# Patient Record
Sex: MALE | Race: AMERICAN INDIAN OR ALASKA NATIVE
[De-identification: names, ages, dates, MRNs, and addresses within clinical notes are randomized per-mention and may not be internally consistent; named-entity substitution may affect disease eponyms.]

---

## 2018-06-04 ENCOUNTER — HOSPITAL ENCOUNTER (EMERGENCY)
Dept: HOSPITAL 5 - ED | Age: 48
LOS: 1 days | Discharge: PSYCHIATRIC OR SUBSTANCE USE FACILITY | End: 2018-06-05
Payer: MEDICARE

## 2018-06-04 DIAGNOSIS — Z79.899: ICD-10-CM

## 2018-06-04 DIAGNOSIS — F14.10: ICD-10-CM

## 2018-06-04 DIAGNOSIS — F12.10: ICD-10-CM

## 2018-06-04 DIAGNOSIS — F32.9: Primary | ICD-10-CM

## 2018-06-04 DIAGNOSIS — F10.129: ICD-10-CM

## 2018-06-04 PROCEDURE — 99285 EMERGENCY DEPT VISIT HI MDM: CPT

## 2018-06-04 PROCEDURE — 81001 URINALYSIS AUTO W/SCOPE: CPT

## 2018-06-04 PROCEDURE — 70450 CT HEAD/BRAIN W/O DYE: CPT

## 2018-06-04 PROCEDURE — 85025 COMPLETE CBC W/AUTO DIFF WBC: CPT

## 2018-06-04 PROCEDURE — 80320 DRUG SCREEN QUANTALCOHOLS: CPT

## 2018-06-04 PROCEDURE — 96360 HYDRATION IV INFUSION INIT: CPT

## 2018-06-04 PROCEDURE — G0480 DRUG TEST DEF 1-7 CLASSES: HCPCS

## 2018-06-04 PROCEDURE — 80307 DRUG TEST PRSMV CHEM ANLYZR: CPT

## 2018-06-04 PROCEDURE — 36415 COLL VENOUS BLD VENIPUNCTURE: CPT

## 2018-06-04 PROCEDURE — 80053 COMPREHEN METABOLIC PANEL: CPT

## 2018-06-05 VITALS — DIASTOLIC BLOOD PRESSURE: 96 MMHG | SYSTOLIC BLOOD PRESSURE: 149 MMHG

## 2018-06-05 LAB
ALBUMIN SERPL-MCNC: 3.6 G/DL (ref 3.9–5)
ALT SERPL-CCNC: 13 UNITS/L (ref 7–56)
BASOPHILS # (AUTO): 0 K/MM3 (ref 0–0.1)
BASOPHILS NFR BLD AUTO: 0.3 % (ref 0–1.8)
BENZODIAZEPINES SCREEN,URINE: (no result)
BILIRUB UR QL STRIP: (no result)
BLOOD UR QL VISUAL: (no result)
BUN SERPL-MCNC: 9 MG/DL (ref 9–20)
BUN/CREAT SERPL: 11 %
CALCIUM SERPL-MCNC: 8.5 MG/DL (ref 8.4–10.2)
EOSINOPHIL # BLD AUTO: 0 K/MM3 (ref 0–0.4)
EOSINOPHIL NFR BLD AUTO: 0.5 % (ref 0–4.3)
HCT VFR BLD CALC: 38.6 % (ref 35.5–45.6)
HEMOLYSIS INDEX: 8
HGB BLD-MCNC: 13 GM/DL (ref 11.8–15.2)
LYMPHOCYTES # BLD AUTO: 1 K/MM3 (ref 1.2–5.4)
LYMPHOCYTES NFR BLD AUTO: 32.6 % (ref 13.4–35)
MCH RBC QN AUTO: 31 PG (ref 28–32)
MCHC RBC AUTO-ENTMCNC: 34 % (ref 32–34)
MCV RBC AUTO: 91 FL (ref 84–94)
METHADONE SCREEN,URINE: (no result)
MONOCYTES # (AUTO): 0.4 K/MM3 (ref 0–0.8)
MONOCYTES % (AUTO): 11.4 % (ref 0–7.3)
MUCOUS THREADS #/AREA URNS HPF: (no result) /HPF
OPIATE SCREEN,URINE: (no result)
PH UR STRIP: 6 [PH] (ref 5–7)
PLATELET # BLD: 197 K/MM3 (ref 140–440)
PROT UR STRIP-MCNC: (no result) MG/DL
RBC # BLD AUTO: 4.23 M/MM3 (ref 3.65–5.03)
RBC #/AREA URNS HPF: 1 /HPF (ref 0–6)
UROBILINOGEN UR-MCNC: 4 MG/DL (ref ?–2)
WBC #/AREA URNS HPF: 1 /HPF (ref 0–6)

## 2018-06-05 NOTE — CAT SCAN REPORT
FINAL REPORT



EXAM:  CT HEAD/BRAIN WO CON



HISTORY:  lethargic, obvious alcohol, uncertain if trauma 



TECHNIQUE:  Routine axial imaging was obtained of the brain

without IV contrast. There are no previous studies available for

comparison.



FINDINGS:  

There is mild generalized atrophy. There are remote strokes in

the right frontal lobe and right temporal lobe. There are remote

lacunar infarcts in the basal ganglia bilaterally as well as in

the right cerebellar hemisphere. There is no evidence of acute

stroke or hemorrhage. The ventricular system is symmetric in

size. The sinuses reveal secretions in the sphenoid sinuses. The

mastoid air cells are well pneumatized. Calvarium is

unremarkable.



IMPRESSION:  

Mild generalized atrophy with remote strokes in the right frontal

and right temporal lobes.



Bilateral remote lacunar infarcts in the basal ganglia.



No definite acute stroke or hemorrhage identified.



Sphenoid sinusitis.

## 2018-06-05 NOTE — CONSULTATION
History of Present Illness





- Reason for Consult


Consult date: 06/05/18


Reason for consult: Mental Health Evaluation


Requesting physician: PABLO LÓPEZ





- Chief Complaint


Chief complaint: 


"I wanted to die"








- History of Present Psychiatric Illness


47 y.o. AA male presenting to the ER for suicide attempt by taking pills. Today 

the patient is calm and cooperative during the assessment. He stated that his 

life is a "mess" because he is unemployed and having family issues. He stated 

that he attempted to take 20 plus pills he couldn't ID prior to his admission 

to the ER to kill himself. Also, he stated that he was drinking (etoh) 

yesterday to lower his depression. He rate his depression 9/10, with 10 being 

the worse. He stated that he feel sad and hopeless with no reason to live. He 

stated having a long hx of alcohol abuse and recreational drug use. He would 

not confirm or deny SI's, but denies HI's and AVH's. He denies any manic 

episodes, but acknowledged erratic sleep.  








Medications and Allergies


 Allergies











Allergy/AdvReac Type Severity Reaction Status Date / Time


 


No Known Allergies Allergy   Unverified 06/04/18 23:42














Past psychiatric history





- Past Medical History


Past Medical History: HIV/AIDS


Past Surgical History: No surgical history





- past Psychiatric treatment and history


psychiatric treatment history: 


Would not confirm or deny a psy hx. He acknowledged a fam psy hx of mood do's. 








- Social History


Social history: lives with family





Mental Status Exam





- Vital signs


 Last Vital Signs











Temp  98.2 F   06/05/18 10:05


 


Pulse  82   06/05/18 10:05


 


Resp  18   06/05/18 10:05


 


BP  118/74   06/05/18 10:05


 


Pulse Ox  100   06/05/18 10:05














- Exam


Narrative exam: 


MSE:





 Appearance: calm    


 Behavior: regular eye contact


 Speech: regular rate and low tone


 Mood: sad, withdrawn, depressed


 Affect: congruent to mood 


 Thought Process: circumstantial               


 Thought Content: denies HI's and AVH's, he would not confirm or deny SI's


 Motor Activity: ambulatory


 Cognition: A/O x 3


 Insight: variable 


 Judgment: variable   














Results


Result Diagrams: 


 06/05/18 04:38





 06/05/18 04:38


 Abnormal lab results











  06/05/18 06/05/18 06/05/18 Range/Units





  04:38 04:38 04:38 


 


WBC  3.2 L    (4.5-11.0)  K/mm3


 


Mono % (Auto)  11.4 H    (0.0-7.3)  %


 


Lymph #  1.0 L    (1.2-5.4)  K/mm3


 


Glucose   107 H   ()  mg/dL


 


Albumin   3.6 L   (3.9-5)  g/dL


 


Salicylates    < 0.3 L  (2.8-20.0)  mg/dL


 


Acetaminophen     (10.0-30.0)  ug/mL














  06/05/18 Range/Units





  04:38 


 


WBC   (4.5-11.0)  K/mm3


 


Mono % (Auto)   (0.0-7.3)  %


 


Lymph #   (1.2-5.4)  K/mm3


 


Glucose   ()  mg/dL


 


Albumin   (3.9-5)  g/dL


 


Salicylates   (2.8-20.0)  mg/dL


 


Acetaminophen  < 5.0 L  (10.0-30.0)  ug/mL








All other labs normal.








Assessment and Plan


Assessment and plan: 


Impression: MDD, Severe Type. Substance Use DO (cocaine). Cannabis Use DO. The 

patient attempted suicide by taking 20 plus pills he couldn't ID yesterday. 

Today the patient is calm and cooperative during the assessment. The patient 

would not confirm or deny SI's.





DDx: R/O Bipolar DO, R/O Substance Induced Mood DO





Recommendation/Plan: Continue 1013 with placement to inpatient psy services. 

Start Remeron 15 mg PO HS for depression. Discusses possible suicidality/

medication induced howie with patient reference Remeron.

## 2018-06-05 NOTE — EMERGENCY DEPARTMENT REPORT
ED General Adult HPI





- General


Chief complaint: Alcohol


Stated complaint: ETOH


Time Seen by Provider: 06/05/18 04:18


Source: patient, EMS


Mode of arrival: Stretcher


Limitations: No Limitations, Altered Mental Status





- History of Present Illness


Initial comments: 





Patient brought in with report that he was about to take some pills, with some 

intent of apparent harm to himself, the patient will also ingested a 

significant amount of alcohol as well.  Friends apparently stopped them, EMS 

was called, patient was brought for further transport.  Patient is 

significantly intoxicated, primarily been sleeping during evaluation, it is 

difficult to get significant history from him, but he has apparently been 

feeling depressed recently and was taking pills as a possible suicide gesture.  

It is difficult to get any stable information about significant suicidal intent

, but his apparent intent to take an overdose ingestion is probably the most 

significant statement of his underlying issues.





Patient's past medical history is difficult to assess, as patient has not had 

prior visits to this emergency Department, but nursing note show the patient 

has a history of being HIV positive.





- Related Data


 Allergies











Allergy/AdvReac Type Severity Reaction Status Date / Time


 


No Known Allergies Allergy   Unverified 06/04/18 23:42














ED Review of Systems


ROS: 


Stated complaint: ETOH


Other details as noted in HPI





Comment: Unobtainable due to pts medical conditions





ED Past Medical Hx





- Past Medical History


Previous Medical History?: Yes


Hx HIV: Yes





- Surgical History


Past Surgical History?: No





- Social History


Smoking Status: Unknown if ever smoked


Substance Use Type: Alcohol





ED Physical Exam





- General


Limitations: No Limitations, Altered Mental Status (odor of alcohol, somnolent, 

arouses with verbal and physical stimulus)


General appearance: appears intoxicated, lethargic





- Head


Head exam: Present: atraumatic, normocephalic





- Eye


Eye exam: Present: PERRL, EOMI





- ENT


ENT exam: Present: normal exam, other (well-healed facial scars to right side 

of face)





- Neck


Neck exam: Present: normal inspection.  Absent: tenderness, meningismus





- Respiratory


Respiratory exam: Present: normal lung sounds bilaterally





- Cardiovascular


Cardiovascular Exam: Present: regular rate, normal heart sounds





- GI/Abdominal


GI/Abdominal exam: Present: soft.  Absent: tenderness, guarding





- Rectal


Rectal exam: Present: deferred





- Extremities Exam


Extremities exam: Present: normal inspection





- Back Exam


Back exam: Present: normal inspection





- Neurological Exam


Neurological exam: Present: other (difficult to assess secondary to patient's 

intoxication, but moves all extremities spontaneously)





- Psychiatric


Psychiatric exam: Present: suicidal ideation





- Skin


Skin exam: Present: warm, dry, intact





ED Course


 Vital Signs











  06/04/18 06/05/18 06/05/18





  23:39 04:58 07:30


 


Temperature 98.0 F  97.6 F


 


Pulse Rate 90  78


 


Respiratory 16 16 16





Rate   


 


Blood Pressure 124/80  


 


Blood Pressure   145/104





[Right]   


 


O2 Sat by Pulse 100 100 100





Oximetry   














  06/05/18





  10:05


 


Temperature 98.2 F


 


Pulse Rate 82


 


Respiratory 18





Rate 


 


Blood Pressure 


 


Blood Pressure 118/74





[Right] 


 


O2 Sat by Pulse 100





Oximetry 














- Reevaluation(s)


Reevaluation #1: 





06/05/18 06:29


Patient still sleeping, wakes with some verbal or physical stimulus, generally 

appropriate, but prefers to sleep, on-call level is negligible, urine drug 

screen is still pending as patient has not urinated yet.





ED Medical Decision Making





- Lab Data


Result diagrams: 


 06/05/18 04:38





 06/05/18 04:38





- Radiology Data


Radiology results: report reviewed (old bilateral lacunar infarct, basal ganglia

, no other acute pathology)





- Medical Decision Making





Patient appears to have had an acute depressive episode, with thoughts or 

actual plan to take pills as method of suicide, but ethanol intoxication and 

his drinking habits appear to have been more significant immediate concern for 

patient.  Patient does not appear to be a significant risk for himself, I'll 

call and salicylate levels are negative, and I do not believe a 1013 and 

involuntary commitment is warranted at this time, but mental health evaluation 

should be proceeded in any case for confirmation, and 1013 may be applied at 

that time, if necessary.





- Differential Diagnosis


alkaline intoxication, drug intoxication, cocaine use, head injury, HIV dec


Critical care attestation.: 


If time is entered above; I have spent that time in minutes in the direct care 

of this critically ill patient, excluding procedure time.








ED Disposition


Clinical Impression: 


 Suicidal ideation, Substance abuse





Depression


Qualifiers:


 Depression Type: unspecified Qualified Code(s): F32.9 - Major depressive 

disorder, single episode, unspecified





Disposition: DC/[facility] PSY HOSP/PSY UNIT


Is pt being admited?: No


Does the pt Need Aspirin: No


Condition: Stable


Referrals: 


PRIMARY CARE,MD [Primary Care Provider] - 3-5 Days


Time of Disposition: 06:00